# Patient Record
Sex: MALE | Race: WHITE | NOT HISPANIC OR LATINO | URBAN - METROPOLITAN AREA
[De-identification: names, ages, dates, MRNs, and addresses within clinical notes are randomized per-mention and may not be internally consistent; named-entity substitution may affect disease eponyms.]

---

## 2020-08-24 ENCOUNTER — COMMUNICATION - HEALTHEAST (OUTPATIENT)
Dept: SCHEDULING | Facility: CLINIC | Age: 45
End: 2020-08-24

## 2020-08-24 ENCOUNTER — COMMUNICATION - HEALTHEAST (OUTPATIENT)
Dept: BEHAVIORAL HEALTH | Facility: CLINIC | Age: 45
End: 2020-08-24

## 2020-08-24 ENCOUNTER — AMBULATORY - HEALTHEAST (OUTPATIENT)
Dept: BEHAVIORAL HEALTH | Facility: CLINIC | Age: 45
End: 2020-08-24

## 2020-10-13 ENCOUNTER — COMMUNICATION - HEALTHEAST (OUTPATIENT)
Dept: SCHEDULING | Facility: CLINIC | Age: 45
End: 2020-10-13

## 2021-05-31 ENCOUNTER — RECORDS - HEALTHEAST (OUTPATIENT)
Dept: ADMINISTRATIVE | Facility: CLINIC | Age: 46
End: 2021-05-31

## 2021-06-10 NOTE — PROGRESS NOTES
"S: 46 y/o male in the [facility] ED presenting with SI.    B: Hx anxiety.  SI w/a plan to step in front of a car.  Pt reported he was walking to a friend's house and thought about jumping in front of every car that drove by.  Pt later called his friend and was brought to the ED.  Pt also reported he prayed for God to take him during which he disclosed to his psychiatrist and was started on new medication.  Pt goes on to state he took the new medication a few nights ago but felt flushed and \"hungover\" the next morning with some swelling of the tongue.  Denied HI, psychosis or substance abuse.  No hx of IP MH admissions.      A: Voluntary  No acute medical concerns.  COVID has not been ordered.    R: Placed on wait list pending bed availability and required labs.  0033 COVID swab requested.  "

## 2021-06-10 NOTE — TELEPHONE ENCOUNTER
R:  Paged provider @ 11:05 am    [id number]/[employer]   Notified unit @ 12:08 pm  dispo to charge RN @ 12:13 pm   Call [phone] after 1 pm

## 2021-06-16 PROBLEM — F33.9 MDD (MAJOR DEPRESSIVE DISORDER), RECURRENT EPISODE (H): Status: ACTIVE | Noted: 2020-08-24

## 2021-06-16 PROBLEM — R45.851 SUICIDAL IDEATION: Status: ACTIVE | Noted: 2020-08-24
